# Patient Record
Sex: MALE | Race: OTHER | HISPANIC OR LATINO | URBAN - METROPOLITAN AREA
[De-identification: names, ages, dates, MRNs, and addresses within clinical notes are randomized per-mention and may not be internally consistent; named-entity substitution may affect disease eponyms.]

---

## 2021-05-26 ENCOUNTER — OUTPATIENT (OUTPATIENT)
Dept: OUTPATIENT SERVICES | Facility: HOSPITAL | Age: 76
LOS: 1 days | Discharge: HOME | End: 2021-05-26
Payer: MEDICARE

## 2021-05-26 LAB
GRAM STN FLD: SIGNIFICANT CHANGE UP
SPECIMEN SOURCE: SIGNIFICANT CHANGE UP

## 2021-05-26 PROCEDURE — 88304 TISSUE EXAM BY PATHOLOGIST: CPT | Mod: 26

## 2021-06-01 LAB — SURGICAL PATHOLOGY STUDY: SIGNIFICANT CHANGE UP

## 2021-06-18 LAB
CULTURE RESULTS: NO GROWTH — SIGNIFICANT CHANGE UP
SPECIMEN SOURCE: SIGNIFICANT CHANGE UP

## 2023-06-27 NOTE — ASU PATIENT PROFILE, ADULT - NSICDXPASTMEDICALHX_GEN_ALL_CORE_FT
PAST MEDICAL HISTORY:  Hypertension      PAST MEDICAL HISTORY:  BPH without urinary obstruction     GERD (gastroesophageal reflux disease)     Glaucoma     Hypertension     Legally blind     Obese

## 2023-06-27 NOTE — ASU PATIENT PROFILE, ADULT - FALL HARM RISK - HARM RISK INTERVENTIONS

## 2023-06-27 NOTE — ASU PATIENT PROFILE, ADULT - NS PREOP UNDERSTANDS INFO
spoke to patient to be  NPO/NO solid foods  after  12Mn. allow to drink water till 2-4 am , dress comfortable, , leave all valuable at home, bring ID photo and insurance cards, ,  escort arranged, address and telephone given to patient/yes

## 2023-06-27 NOTE — ASU PATIENT PROFILE, ADULT - NSICDXPASTSURGICALHX_GEN_ALL_CORE_FT
PAST SURGICAL HISTORY:  H/O blepharoplasty     H/O cataract extraction     H/O total knee replacement, bilateral     History of corneal transplant

## 2023-06-27 NOTE — ASU PATIENT PROFILE, ADULT - MEDICATIONS TO TAKE
lisinopril  20 mg patient takes Plavix  stopped 5 days ago,  as per MD  Cardiologist , will bring letter from MD

## 2023-06-27 NOTE — ASU PATIENT PROFILE, ADULT - LANGUAGE ASSISTANCE NEEDED
What Type Of Note Output Would You Prefer (Optional)?: Standard Output Hpi Title: Evaluation of Skin Lesions How Severe Are Your Spot(S)?: moderate Have Your Spot(S) Been Treated In The Past?: has not been treated Yes-Patient/Caregiver accepts free interpretation services...

## 2023-06-28 ENCOUNTER — OUTPATIENT (OUTPATIENT)
Dept: OUTPATIENT SERVICES | Facility: HOSPITAL | Age: 78
LOS: 1 days | Discharge: HOME | End: 2023-06-28
Payer: MEDICARE

## 2023-06-28 VITALS
HEART RATE: 113 BPM | RESPIRATION RATE: 16 BRPM | WEIGHT: 200.18 LBS | TEMPERATURE: 98 F | HEIGHT: 67 IN | SYSTOLIC BLOOD PRESSURE: 138 MMHG | OXYGEN SATURATION: 98 % | DIASTOLIC BLOOD PRESSURE: 78 MMHG

## 2023-06-28 VITALS
OXYGEN SATURATION: 97 % | RESPIRATION RATE: 16 BRPM | DIASTOLIC BLOOD PRESSURE: 82 MMHG | TEMPERATURE: 98 F | HEART RATE: 92 BPM | SYSTOLIC BLOOD PRESSURE: 134 MMHG

## 2023-06-28 DIAGNOSIS — Z96.653 PRESENCE OF ARTIFICIAL KNEE JOINT, BILATERAL: Chronic | ICD-10-CM

## 2023-06-28 DIAGNOSIS — Z98.49 CATARACT EXTRACTION STATUS, UNSPECIFIED EYE: Chronic | ICD-10-CM

## 2023-06-28 DIAGNOSIS — Z94.7 CORNEAL TRANSPLANT STATUS: Chronic | ICD-10-CM

## 2023-06-28 DIAGNOSIS — Z98.890 OTHER SPECIFIED POSTPROCEDURAL STATES: Chronic | ICD-10-CM

## 2023-06-28 LAB
GRAM STN FLD: SIGNIFICANT CHANGE UP
SPECIMEN SOURCE: SIGNIFICANT CHANGE UP

## 2023-06-28 PROCEDURE — 88304 TISSUE EXAM BY PATHOLOGIST: CPT | Mod: 26

## 2023-06-28 RX ORDER — ACETAMINOPHEN 500 MG
650 TABLET ORAL ONCE
Refills: 0 | Status: DISCONTINUED | OUTPATIENT
Start: 2023-06-28 | End: 2023-06-28

## 2023-06-28 RX ORDER — PILOCARPINE HCL 4 %
1 DROPS OPHTHALMIC (EYE) ONCE
Refills: 0 | Status: COMPLETED | OUTPATIENT
Start: 2023-06-28 | End: 2023-06-28

## 2023-06-28 RX ORDER — SODIUM CHLORIDE 9 MG/ML
1000 INJECTION, SOLUTION INTRAVENOUS
Refills: 0 | Status: DISCONTINUED | OUTPATIENT
Start: 2023-06-28 | End: 2023-06-28

## 2023-06-28 RX ADMIN — Medication 1 DROP(S): at 10:40

## 2023-06-28 NOTE — OPERATIVE REPORT - OPERATIVE RPOSRT DETAILS
Pre-operative diagnosis: Bullous keratopathy, left eye    Post-operative diagnosis: Bullous keratopathy, left eye    Procedure: Descemet’s stripping automated endothelial keratoplasty, left eye (8 mm)    Specimen: host descemet’s and donor corneal rim    Blood loss <1 CC    Complications: None    Anesthesia: MAC and subtenons'    After the patient was brought to the operative room, the anesthesiologist established intravenous lines, cardiac monitoring leads, and mild intravenous sedation was  administered. The surgical eye was then prepped and draped in the usual sterile fashion.    An eyelid speculum was inserted into the operative eye.  2.5 CC of 2% lidocaine and 0.75% bupivicaine were injected into the subtenons space. The decision was made to use a 8.0 mm donor punch.  The donor was retrieved from the Optisol media and punched using an 8.0mm trephine blade.  The donor button was pulled into a Tan Endoglide. Paracenteses were created at the 9, 3, and 6 o'clock positions, and Trypan blue was injected into the anterior chamber to stain Descemet's membrane.  Balanced salt solution was used to wash out the anterior chamber.  The anterior chamber was filled with viscoelastic. A clear corneal incision was created superiorly using a 2.75 mm keratome, due to a temporal tube.  A Reverse Sinsky hook was used to score Descemet's membrane and strip it then a keratome blade was used to enlarge the temporal incision to 4 mm.  The viscoelastic was then irrigated using bimanual IA with AC maintainer.    Attention was turned to the donor, which was placed in the operating field. The graft was pulled into the anterior chamber with tan Endoglide forceps.  BSS was used to reform the chamber.  The donor was positioned as necessary with a Reverse Sinsky hook and ballottement.  The paracentesis and corneal incisions were sutured and water-tight.  Air on a 30 gauge needle was injected at the limbus until the eye had a digital pressure of approximately 40 - 50.  It was left in this state for 10 minutes.  Air was then replaced with balanced salt solution until there was a 50% air bubble left in the eye and the pressure was normal. Because of a very faint S stamp, graft orientation was not able to be ascertained at the end of the case.    Injections of antibiotics and steroids were given in the inferior fornix with the needle tip visible at all times.  The eyelid speculum was removed from the eye along with the drapes. Antiobiotic and steroid and dilating drops were placed in the eye.  The eye was patched and shielded.  The patient was taken to the recovery room in stable condition and instructed to lay flat until their appointment the following day. There were no complications.

## 2023-06-28 NOTE — ASU DISCHARGE PLAN (ADULT/PEDIATRIC) - NS MD DC FALL RISK RISK
For information on Fall & Injury Prevention, visit: [email or web address] OR  [email or web address] OR  https://www.cdc.gov/steadi/patient.html

## 2023-06-28 NOTE — ASU PREOP CHECKLIST - LATEX ALLERGY
no
Gen - WDWN, NAD, comfortable and non-toxic appearing  Skin - warm, dry, intact   HEENT - AT/NC, airway patent, neck supple   CV - S1S2, R/R/R  Resp - CTAB, no r/r/w  GI - soft, ND, NT, no CVAT b/l   MS - w/w/p, no c/c/e  Neuro - AxOx3, ambulatory without gait disturbance

## 2023-06-28 NOTE — PRE-ANESTHESIA EVALUATION ADULT - NSANTHPMHFT_GEN_ALL_CORE
stress test 6/15/23 WNL per cardiology note cardiology note from 6/15/23 reviewed; stress test 6/15/23 WNL with good LV function and good exercise tolerance per cardiology note.  Pt is on plavix with last dose on 6/21/23. After a thorough chart review (including note from cardiology), it is unclear to me why pt is currently on plavix- possibly for history of PVD however pt denies ever having a stent placed in the past. Pt also denies ever having a clot or stroke in the past.

## 2023-07-06 PROBLEM — E66.9 OBESITY, UNSPECIFIED: Chronic | Status: ACTIVE | Noted: 2023-06-28

## 2023-07-06 PROBLEM — H40.9 UNSPECIFIED GLAUCOMA: Chronic | Status: ACTIVE | Noted: 2023-06-28

## 2023-07-06 PROBLEM — N40.0 BENIGN PROSTATIC HYPERPLASIA WITHOUT LOWER URINARY TRACT SYMPTOMS: Chronic | Status: ACTIVE | Noted: 2023-06-28

## 2023-07-06 PROBLEM — H54.8 LEGAL BLINDNESS, AS DEFINED IN USA: Chronic | Status: ACTIVE | Noted: 2023-06-28

## 2023-07-11 NOTE — ASU PATIENT PROFILE, ADULT - NS PREOP UNDERSTANDS INFO
No solid food/dairy/candy/gum after midnight, water allowed before 08:30am tomorrow; patient reminded to come with photo ID/insurance/credit card; dress in comfortable clothes; no jewelries/valuables/contacts, no smoking/drinking alcohol/recreational drug use today, escort to have a photo, address and callback number was given/yes

## 2023-07-11 NOTE — ASU PATIENT PROFILE, ADULT - NSICDXPASTMEDICALHX_GEN_ALL_CORE_FT
PAST MEDICAL HISTORY:  BPH without urinary obstruction     GERD (gastroesophageal reflux disease)     Glaucoma     Hypertension     Legally blind     Obese      PAST MEDICAL HISTORY:  BPH without urinary obstruction     Glaucoma     Hypertension     Legally blind     Obese

## 2023-07-11 NOTE — ASU PATIENT PROFILE, ADULT - NSICDXPASTSURGICALHX_GEN_ALL_CORE_FT
PAST SURGICAL HISTORY:  H/O blepharoplasty     H/O cataract extraction     H/O total knee replacement, bilateral     History of corneal transplant      PAST SURGICAL HISTORY:  H/O blepharoplasty     H/O cataract extraction     H/O colonoscopy     H/O total knee replacement, bilateral     History of corneal transplant

## 2023-07-11 NOTE — ASU PATIENT PROFILE, ADULT - FALL HARM RISK - HARM RISK INTERVENTIONS

## 2023-07-12 ENCOUNTER — OUTPATIENT (OUTPATIENT)
Dept: OUTPATIENT SERVICES | Facility: HOSPITAL | Age: 78
LOS: 1 days | Discharge: HOME | End: 2023-07-12
Payer: MEDICARE

## 2023-07-12 VITALS
TEMPERATURE: 98 F | HEART RATE: 80 BPM | OXYGEN SATURATION: 100 % | DIASTOLIC BLOOD PRESSURE: 77 MMHG | SYSTOLIC BLOOD PRESSURE: 144 MMHG | RESPIRATION RATE: 16 BRPM

## 2023-07-12 VITALS
RESPIRATION RATE: 16 BRPM | OXYGEN SATURATION: 96 % | WEIGHT: 194.01 LBS | SYSTOLIC BLOOD PRESSURE: 151 MMHG | HEART RATE: 80 BPM | DIASTOLIC BLOOD PRESSURE: 91 MMHG | TEMPERATURE: 99 F | HEIGHT: 67 IN

## 2023-07-12 DIAGNOSIS — Z94.7 CORNEAL TRANSPLANT STATUS: Chronic | ICD-10-CM

## 2023-07-12 DIAGNOSIS — Z98.890 OTHER SPECIFIED POSTPROCEDURAL STATES: Chronic | ICD-10-CM

## 2023-07-12 DIAGNOSIS — Z98.49 CATARACT EXTRACTION STATUS, UNSPECIFIED EYE: Chronic | ICD-10-CM

## 2023-07-12 DIAGNOSIS — Z96.653 PRESENCE OF ARTIFICIAL KNEE JOINT, BILATERAL: Chronic | ICD-10-CM

## 2023-07-12 PROBLEM — I10 ESSENTIAL (PRIMARY) HYPERTENSION: Chronic | Status: ACTIVE | Noted: 2023-06-27

## 2023-07-12 LAB
GRAM STN FLD: SIGNIFICANT CHANGE UP
ISTAT VENOUS BE: 2 MMOL/L — SIGNIFICANT CHANGE UP (ref -2–3)
ISTAT VENOUS GLUCOSE: 117 MG/DL — HIGH (ref 70–99)
ISTAT VENOUS HCO3: 29 MMOL/L — HIGH (ref 23–28)
ISTAT VENOUS HEMATOCRIT: 44 % — SIGNIFICANT CHANGE UP (ref 39–50)
ISTAT VENOUS HEMOGLOBIN: 15 GM/DL — SIGNIFICANT CHANGE UP (ref 13–17)
ISTAT VENOUS IONIZED CALCIUM: 1.26 MMOL/L — SIGNIFICANT CHANGE UP (ref 1.12–1.3)
ISTAT VENOUS PCO2: 52 MMHG — HIGH (ref 41–51)
ISTAT VENOUS PH: 7.36 — SIGNIFICANT CHANGE UP (ref 7.31–7.41)
ISTAT VENOUS PO2: <66 MMHG — LOW (ref 35–40)
ISTAT VENOUS POTASSIUM: 4.2 MMOL/L — SIGNIFICANT CHANGE UP (ref 3.5–5.3)
ISTAT VENOUS SO2: 37 % — SIGNIFICANT CHANGE UP
ISTAT VENOUS SODIUM: 143 MMOL/L — SIGNIFICANT CHANGE UP (ref 135–145)
ISTAT VENOUS TCO2: 30 MMOL/L — SIGNIFICANT CHANGE UP (ref 22–31)
SPECIMEN SOURCE: SIGNIFICANT CHANGE UP

## 2023-07-12 PROCEDURE — 88304 TISSUE EXAM BY PATHOLOGIST: CPT | Mod: 26

## 2023-07-12 RX ORDER — KETOROLAC TROMETHAMINE 30 MG/ML
30 SYRINGE (ML) INJECTION ONCE
Refills: 0 | Status: DISCONTINUED | OUTPATIENT
Start: 2023-07-12 | End: 2023-07-12

## 2023-07-12 RX ORDER — SODIUM CHLORIDE 9 MG/ML
1000 INJECTION, SOLUTION INTRAVENOUS
Refills: 0 | Status: DISCONTINUED | OUTPATIENT
Start: 2023-07-12 | End: 2023-07-12

## 2023-07-12 RX ORDER — ACETAMINOPHEN 500 MG
1000 TABLET ORAL ONCE
Refills: 0 | Status: DISCONTINUED | OUTPATIENT
Start: 2023-07-12 | End: 2023-07-12

## 2023-07-12 RX ORDER — FUROSEMIDE 40 MG
1 TABLET ORAL
Refills: 0 | DISCHARGE

## 2023-07-12 RX ORDER — PILOCARPINE HCL 4 %
1 DROPS OPHTHALMIC (EYE) ONCE
Refills: 0 | Status: COMPLETED | OUTPATIENT
Start: 2023-07-12 | End: 2023-07-12

## 2023-07-12 RX ORDER — TAMSULOSIN HYDROCHLORIDE 0.4 MG/1
1 CAPSULE ORAL
Refills: 0 | DISCHARGE

## 2023-07-12 RX ORDER — ACETAMINOPHEN 500 MG
650 TABLET ORAL ONCE
Refills: 0 | Status: DISCONTINUED | OUTPATIENT
Start: 2023-07-12 | End: 2023-07-12

## 2023-07-12 RX ADMIN — Medication 1 DROP(S): at 11:45

## 2023-07-12 RX ADMIN — Medication 1 DROP(S): at 11:40

## 2023-07-12 RX ADMIN — Medication 1 DROP(S): at 11:35

## 2023-07-12 NOTE — ANESTHESIA FOLLOW-UP NOTE - NSEVALATIONFT_GEN_ALL_CORE
Pt complaining of severe left eye pain in PACU. Fentanyl IV 50mcg given at 14:28. An additional fentanyl 50mcg IV given at 14:35.

## 2023-07-12 NOTE — PRE-ANESTHESIA EVALUATION ADULT - NSANTHPMHFT_GEN_ALL_CORE
I provided anesthesia for Mr. Monet on 6/28/23 for his descemet's stripping automated endothelial keratoplaty of his left eye. Pt tolerated the procedure well.     stress test 6/15/23 WNL with good LV function and good exercise tolerance per cardiology note in June. Pt has been off his plavix for about 2 weeks. It remains unclear to me why pt is on plavix.

## 2023-07-12 NOTE — PRE-ANESTHESIA EVALUATION ADULT - NSANTHPEFT_GEN_ALL_CORE
alert and oriented x3, no acute distress, CTAB, RRR alert and oriented x3, no acute distress, obese, CTAB, RRR

## 2023-07-12 NOTE — OPERATIVE REPORT - OPERATIVE RPOSRT DETAILS
Pre-operative diagnosis: Failed corneal graft, left eye    Post-operative diagnosis: Failed corneal graft, left eye    Procedure: Descemet’s stripping automated endothelial keratoplasty, left eye (8 mm)    Specimen: host descemet’s and donor corneal rim    Blood loss <1 CC    Complications: None    Anesthesia: MAC and subtenon's anesthesia    After the patient was brought to the operative room, the anesthesiologist established intravenous lines, cardiac monitoring leads, and mild intravenous sedation was  administered. The surgical eye was then prepped and draped in the usual sterile fashion.    An eyelid speculum was inserted into the operative eye.  2.5 CC of 2% lidocaine and 0.75% bupivicaine were injected into the subtenons space. The decision was made to use a 8.0 mm donor punch as before.  The donor was retrieved from the Optisol media and punched using an 8.0 mm trephine blade.  The donor button was pulled into a Tan Endoglide. Paracenteses were opened at the 9, 3, and 6 o'clock positions and the clear corneal incision was opened superiorly with a cyclodialysis spatula.  A Reverse Sinsky hook was used to remove the failed corneal graft. An AC maintainer was placed temporally.    Attention was turned to the donor, which was placed in the operating field. The graft was pulled into the anterior chamber with tan Endoglide forceps.  BSS was used to reform the chamber.  The donor was positioned as necessary with a Reverse Sinsky hook and ballottement.  The paracentesis and corneal incisions were sutured and water-tight.  20% air on a 30 gauge needle was injected at the limbus until the eye had a digital pressure of approximately 40 - 50.  It was left in this state for 10 minutes.  Air was then replaced with balanced salt solution until there was a 50% air bubble left in the eye and the pressure was normal.    Injections of antibiotics and steroids were given in the inferior fornix with the needle tip visible at all times.  The eyelid speculum was removed from the eye along with the drapes.  Antiobiotic and steroid and dilating drops were injected subtenons'.  The eye was patched and shielded.  The patient was taken to the recovery room in stable condition and instructed to lay flat until their appointment the following day. There were no complications.

## 2023-07-12 NOTE — ASU DISCHARGE PLAN (ADULT/PEDIATRIC) - NS MD DC FALL RISK RISK
For information on Fall & Injury Prevention, visit: [email or web address] OR  [email or web address] OR  https://www.cdc.gov/steadi/patient.html

## 2023-07-21 LAB
-  CEFTRIAXONE: SIGNIFICANT CHANGE UP
-  PENICILLIN: SIGNIFICANT CHANGE UP
-  TETRACYCLINE: SIGNIFICANT CHANGE UP
CULTURE RESULTS: SIGNIFICANT CHANGE UP
METHOD TYPE: SIGNIFICANT CHANGE UP
ORGANISM # SPEC MICROSCOPIC CNT: SIGNIFICANT CHANGE UP
ORGANISM # SPEC MICROSCOPIC CNT: SIGNIFICANT CHANGE UP
SPECIMEN SOURCE: SIGNIFICANT CHANGE UP
SURGICAL PATHOLOGY STUDY: SIGNIFICANT CHANGE UP

## 2023-07-22 LAB
CULTURE RESULTS: NO GROWTH — SIGNIFICANT CHANGE UP
SPECIMEN SOURCE: SIGNIFICANT CHANGE UP

## 2023-07-24 LAB — SURGICAL PATHOLOGY STUDY: SIGNIFICANT CHANGE UP

## 2023-10-17 NOTE — PRE-ANESTHESIA EVALUATION ADULT - BP NONINVASIVE SYSTOLIC (MM HG)
Patient complain about swelling and bruising for last two weeks. He played football two weeks ago and believe injured at that time. Good range of motion in arm. Distal pulses present and strong. Denies any pain.   138

## (undated) DEVICE — ILM RESOLUTION FORCEP 25G DISP

## (undated) DEVICE — KNIFE ALCON STANDARD FULL HANDLE 15 DEG (PINK)

## (undated) DEVICE — CENTURION PAK FACO ACTIVE INTREPID FMS 0.9 MM ULTRA

## (undated) DEVICE — KNIFE FULL HANDLE ANGLE 2.75MM

## (undated) DEVICE — ACM SELF RETAINING 20G

## (undated) DEVICE — WARMING BLANKET LOWER ADULT

## (undated) DEVICE — SOL IRR BAL SALT 500ML

## (undated) DEVICE — GLV 6.5 PROTEXIS (BLUE)

## (undated) DEVICE — PETRI DISH MED 3.5"

## (undated) DEVICE — DRSG STERISTRIPS 0.5 X 4"

## (undated) DEVICE — VENODYNE/SCD SLEEVE CALF MEDIUM

## (undated) DEVICE — SPEAR CELLULOSE 40410

## (undated) DEVICE — SUT NYLON 10-0 12" CU-5

## (undated) DEVICE — FILTER SYR 22 MICRONS

## (undated) DEVICE — PUNCH CORNEAL BARRON 8MM

## (undated) DEVICE — KNIFE ALCON PARACENTESIS CLEARCUT SIDEPORT 1.2MM (YELLOW)

## (undated) DEVICE — NDL HYPO NONSAFE 30G X 0.5" (BEIGE)

## (undated) DEVICE — PACK ANTERIOR SEGMENT

## (undated) DEVICE — CULTURESWAB WITHOUT CHARCOAL

## (undated) DEVICE — CANNULA BD & CO SUBTENONS

## (undated) DEVICE — DRAPE MEDIUM SHEET 44" X 70"

## (undated) DEVICE — SYR LUER LOK 5CC

## (undated) DEVICE — ENDOGLIDE ULTRATHIN